# Patient Record
Sex: MALE | Race: OTHER | Employment: OTHER | URBAN - NONMETROPOLITAN AREA
[De-identification: names, ages, dates, MRNs, and addresses within clinical notes are randomized per-mention and may not be internally consistent; named-entity substitution may affect disease eponyms.]

---

## 2024-04-24 ENCOUNTER — EVALUATION (OUTPATIENT)
Dept: PHYSICAL THERAPY | Facility: CLINIC | Age: 64
End: 2024-04-24
Payer: MEDICARE

## 2024-04-24 DIAGNOSIS — M25.512 CHRONIC LEFT SHOULDER PAIN: Primary | ICD-10-CM

## 2024-04-24 DIAGNOSIS — G89.29 CHRONIC LEFT SHOULDER PAIN: Primary | ICD-10-CM

## 2024-04-24 PROCEDURE — 97161 PT EVAL LOW COMPLEX 20 MIN: CPT | Performed by: PHYSICAL THERAPIST

## 2024-04-24 PROCEDURE — 97110 THERAPEUTIC EXERCISES: CPT | Performed by: PHYSICAL THERAPIST

## 2024-04-24 PROCEDURE — 97140 MANUAL THERAPY 1/> REGIONS: CPT | Performed by: PHYSICAL THERAPIST

## 2024-04-24 NOTE — LETTER
2024    Chet Au MD  [address] [facility]  [place] [address]    Patient: Amadeo Fletcher   YOB: 1960   Date of Visit: 2024     Encounter Diagnosis     ICD-10-CM    1. Chronic left shoulder pain  M25.512     G89.29           Dear Dr. Au:    Thank you for your recent referral of Amadeo Fletcher. Please review the attached evaluation summary from Amadeo's recent visit.     Please verify that you agree with the plan of care by signing the attached order.     If you have any questions or concerns, please do not hesitate to call.     I sincerely appreciate the opportunity to share in the care of one of your patients and hope to have another opportunity to work with you in the near future.       Sincerely,    Bird Larsen, PT      Referring Provider:      I certify that I have read the below Plan of Care and certify the need for these services furnished under this plan of treatment while under my care.                    Chet Au MD  [address] [facility]  [place] [address]  Via Fax: [phone]          PT Evaluation     Today's date: 2024  Patient name: Amadeo Fletcher  : 1960  MRN: [id number]  Referring provider: Chet Au MD  Dx:   Encounter Diagnosis     ICD-10-CM    1. Chronic left shoulder pain  M25.512     G89.29           Start Time: 0810  Stop Time: 0900  Total time in clinic (min): 50 minutes    Assessment  Assessment details: Patient is a 64 y/o male with chief c/o L shoulder pain. Patient has note restriction to the L shoulder mobility during arom assessment. There was tenderness noted to the posterior aspect of the L GH joint. Restrictions noted with inferior and posterior capsule mobility assessment. There was increased pain with firm end feel noted at all end ranges of passive mobility assessment. Patient responded well to passive stretching and demonstrated progression of passive mobility during therapy interventions. We reviewed his exercises for  "Hep with verbal understanding noted.   Impairments: abnormal or restricted ROM, activity intolerance, impaired physical strength, lacks appropriate home exercise program and pain with function    Symptom irritability: moderateUnderstanding of Dx/Px/POC: good   Prognosis: good    Goals  STGs:  \"Patient will be independent with hep by 2-3 visits.   Improve shoulder flexion to 140 degrees for improved tolerance with adls and home duties by 3-4 weeks.   Decrease pain levels by 50% for improved tolerance with adls and home duties by 3-4 weeks. \"    LTGs:  \"Improve FOTO score from 45 to 65 indicating improved tolerance with activities involving the upper extremities by discharge.   Patient will demonstrate full, pain free strength and rom of the shoulder for improved tolerance with adls and home duties by discharge.  Sleep will be undisturbed from the shoulder by discharge. \"      Plan  Plan details: Patient informed that from this point forward, to ensure adherence to the aforementioned plan of care, all or some of the treatment may be performed and carried out by a Physical Therapy Assistant (PTA) with supervision from a licensed Physical Therapist (PT) in accordance with [facility] Physical Therapy Practice Act.  Patient will continue to benefit from skilled physical therapy to address the functional deficits that were identified during the evaluation today. We will continue to progress the therapy program to address these functional deficits and achieve the established goals.          Patient would benefit from: skilled physical therapy  Planned modality interventions: cryotherapy  Planned therapy interventions: ADL retraining, body mechanics training, functional ROM exercises, flexibility, home exercise program, therapeutic exercise, therapeutic activities, stretching, strengthening, patient education, postural training, manual therapy and joint mobilization  Frequency: 2-3x week.  Duration in weeks: 6  Plan " of Care beginning date: 2024  Plan of Care expiration date: 2024  Treatment plan discussed with: patient        Subjective Evaluation    History of Present Illness  Mechanism of injury: Patient presents to out patient physical therapy with chief c/o L shoulder pain. Patient reports onset of symptoms to the L shoulder after receiving injections in his L arm approximately 6 months ago. He notes that he has difficulties with laying on his L side and when he is reaching overhead. He feels that the pain is worse when he is trying to sleep at night. He finds relief when he has his arm at rest by his side. He feels that the symptoms will travel into the L forearm and hand at times also.           Recurrent probem    Quality of life: good    Patient Goals  Patient goals for therapy: decreased pain, increased motion, increased strength, independence with ADLs/IADLs and return to sport/leisure activities    Pain  Current pain ratin  At best pain ratin  At worst pain ratin  Location: L shoulder  Quality: sharp, discomfort, throbbing and radiating  Relieving factors: rest and medications  Aggravating factors: overhead activity and lifting (Sleeping, L side lying)        Objective     Static Posture     Scapulae  Left elevated.    Active Range of Motion   Left Shoulder   Flexion: 95 degrees with pain  Extension: 34 degrees with pain  Abduction: 92 degrees with pain    Right Shoulder   Flexion: 152 degrees   Extension: 54 degrees   Abduction: 152 degrees     Passive Range of Motion   Left Shoulder   Flexion: 115 degrees   External rotation 45°: 46 degrees   Internal rotation 45°: 49 degrees     Right Shoulder   Normal passive range of motion    Strength/Myotome Testing     Left Shoulder     Planes of Motion   Flexion: 4-   Extension: 4-   Abduction: 3+   Adduction: 4-   External rotation at 0°: 3+   Internal rotation at 0°: 3+     Right Shoulder   Normal muscle strength    Tests     Left Shoulder   Positive  "Pablo's.   Negative drop arm.              Precautions: None      Date 4/24 IE       FOTO 45 SC       Manuals        L shoulder PROM 10 min                               Neuro Re-Ed                                                                Ther Ex        Supine wand flexion 15x 5\"       Supine wand ER 5\" 15x       Table slide flex, ER 5\" 15x                                               Ther Activity                        Gait Training                        Modalities                                               "

## 2024-04-24 NOTE — PROGRESS NOTES
"PT Evaluation     Today's date: 2024  Patient name: Amadeo Fletcher  : 1960  MRN: [id number]  Referring provider: Chet Au MD  Dx:   Encounter Diagnosis     ICD-10-CM    1. Chronic left shoulder pain  M25.512     G89.29           Start Time: 0810  Stop Time: 0900  Total time in clinic (min): 50 minutes    Assessment  Assessment details: Patient is a 64 y/o male with chief c/o L shoulder pain. Patient has note restriction to the L shoulder mobility during arom assessment. There was tenderness noted to the posterior aspect of the L GH joint. Restrictions noted with inferior and posterior capsule mobility assessment. There was increased pain with firm end feel noted at all end ranges of passive mobility assessment. Patient responded well to passive stretching and demonstrated progression of passive mobility during therapy interventions. We reviewed his exercises for Hep with verbal understanding noted.   Impairments: abnormal or restricted ROM, activity intolerance, impaired physical strength, lacks appropriate home exercise program and pain with function    Symptom irritability: moderateUnderstanding of Dx/Px/POC: good   Prognosis: good    Goals  STGs:  \"Patient will be independent with hep by 2-3 visits.   Improve shoulder flexion to 140 degrees for improved tolerance with adls and home duties by 3-4 weeks.   Decrease pain levels by 50% for improved tolerance with adls and home duties by 3-4 weeks. \"    LTGs:  \"Improve FOTO score from 45 to 65 indicating improved tolerance with activities involving the upper extremities by discharge.   Patient will demonstrate full, pain free strength and rom of the shoulder for improved tolerance with adls and home duties by discharge.  Sleep will be undisturbed from the shoulder by discharge. \"      Plan  Plan details: Patient informed that from this point forward, to ensure adherence to the aforementioned plan of care, all or some of the treatment may be " performed and carried out by a Physical Therapy Assistant (PTA) with supervision from a licensed Physical Therapist (PT) in accordance with [facility] Physical Therapy Practice Act.  Patient will continue to benefit from skilled physical therapy to address the functional deficits that were identified during the evaluation today. We will continue to progress the therapy program to address these functional deficits and achieve the established goals.          Patient would benefit from: skilled physical therapy  Planned modality interventions: cryotherapy  Planned therapy interventions: ADL retraining, body mechanics training, functional ROM exercises, flexibility, home exercise program, therapeutic exercise, therapeutic activities, stretching, strengthening, patient education, postural training, manual therapy and joint mobilization  Frequency: 2-3x week.  Duration in weeks: 6  Plan of Care beginning date: 2024  Plan of Care expiration date: 2024  Treatment plan discussed with: patient        Subjective Evaluation    History of Present Illness  Mechanism of injury: Patient presents to out patient physical therapy with chief c/o L shoulder pain. Patient reports onset of symptoms to the L shoulder after receiving injections in his L arm approximately 6 months ago. He notes that he has difficulties with laying on his L side and when he is reaching overhead. He feels that the pain is worse when he is trying to sleep at night. He finds relief when he has his arm at rest by his side. He feels that the symptoms will travel into the L forearm and hand at times also.           Recurrent probem    Quality of life: good    Patient Goals  Patient goals for therapy: decreased pain, increased motion, increased strength, independence with ADLs/IADLs and return to sport/leisure activities    Pain  Current pain ratin  At best pain ratin  At worst pain ratin  Location: L shoulder  Quality: sharp,  "discomfort, throbbing and radiating  Relieving factors: rest and medications  Aggravating factors: overhead activity and lifting (Sleeping, L side lying)        Objective     Static Posture     Scapulae  Left elevated.    Active Range of Motion   Left Shoulder   Flexion: 95 degrees with pain  Extension: 34 degrees with pain  Abduction: 92 degrees with pain    Right Shoulder   Flexion: 152 degrees   Extension: 54 degrees   Abduction: 152 degrees     Passive Range of Motion   Left Shoulder   Flexion: 115 degrees   External rotation 45°: 46 degrees   Internal rotation 45°: 49 degrees     Right Shoulder   Normal passive range of motion    Strength/Myotome Testing     Left Shoulder     Planes of Motion   Flexion: 4-   Extension: 4-   Abduction: 3+   Adduction: 4-   External rotation at 0°: 3+   Internal rotation at 0°: 3+     Right Shoulder   Normal muscle strength    Tests     Left Shoulder   Positive Hawkin's.   Negative drop arm.              Precautions: None      Date 4/24 IE       FOTO 45 SC       Manuals        L shoulder PROM 10 min                               Neuro Re-Ed                                                                Ther Ex        Supine wand flexion 15x 5\"       Supine wand ER 5\" 15x       Table slide flex, ER 5\" 15x                                               Ther Activity                        Gait Training                        Modalities                               "

## 2024-04-29 ENCOUNTER — OFFICE VISIT (OUTPATIENT)
Dept: PHYSICAL THERAPY | Facility: CLINIC | Age: 64
End: 2024-04-29
Payer: MEDICARE

## 2024-04-29 DIAGNOSIS — G89.29 CHRONIC LEFT SHOULDER PAIN: Primary | ICD-10-CM

## 2024-04-29 DIAGNOSIS — M25.512 CHRONIC LEFT SHOULDER PAIN: Primary | ICD-10-CM

## 2024-04-29 PROCEDURE — 97110 THERAPEUTIC EXERCISES: CPT

## 2024-04-29 PROCEDURE — 97140 MANUAL THERAPY 1/> REGIONS: CPT

## 2024-04-29 NOTE — PROGRESS NOTES
"Daily Note     Today's date: 2024  Patient name: Amadoe Fletcher  : 1960  MRN: [id number]  Referring provider: Chet Au MD  Dx:   Encounter Diagnosis     ICD-10-CM    1. Chronic left shoulder pain  M25.512     G89.29           Start Time: 1150  Stop Time: 1220  Total time in clinic (min): 30 minutes    Subjective: The patient states that he has a little pain in his left shoulder today, which he rates at 3/10      Objective: See treatment diary below      Assessment: Tolerated treatment well. The patient complained of increased shoulder pain with supine wand flexion. Added several new exercises today including pulleys, shoulder shrugs, scapular retractions and no monies - all with good tolerance and minimal complaints of increased shoulder pain. Patient exhibited good technique with therapeutic exercises and would benefit from continued PT      Plan: Continue per plan of care.      Precautions: None      Date  IE       FOTO 45 SC       Manuals        L shoulder PROM 10 min 10 min AW                              Neuro Re-Ed                                                                Ther Ex        Supine wand flexion 15x 5\" 5\" 15x      Supine wand ER 5\" 15x 5\" 15x      Table slide flex, ER 5\" 15x 5\" 15x      Pulleys  5\" 10x      No monies  15x      Shoulder shrugs  5\" 15x      Scap retr  5\" x15              Ther Activity                        Gait Training                        Modalities                               "

## 2024-05-01 ENCOUNTER — OFFICE VISIT (OUTPATIENT)
Dept: PHYSICAL THERAPY | Facility: CLINIC | Age: 64
End: 2024-05-01
Payer: MEDICARE

## 2024-05-01 DIAGNOSIS — M25.512 CHRONIC LEFT SHOULDER PAIN: Primary | ICD-10-CM

## 2024-05-01 DIAGNOSIS — G89.29 CHRONIC LEFT SHOULDER PAIN: Primary | ICD-10-CM

## 2024-05-01 PROCEDURE — 97140 MANUAL THERAPY 1/> REGIONS: CPT

## 2024-05-01 PROCEDURE — 97110 THERAPEUTIC EXERCISES: CPT

## 2024-05-01 NOTE — PROGRESS NOTES
"Daily Note     Today's date: 2024  Patient name: Amadeo Fletcher  : 1960  MRN: [id number]  Referring provider: Chet Au MD  Dx:   Encounter Diagnosis     ICD-10-CM    1. Chronic left shoulder pain  M25.512     G89.29           Start Time: 1145  Stop Time: 1230  Total time in clinic (min): 45 minutes    Subjective: Patient reports having some pain in the L shoulder today.      Objective: See treatment diary below      Assessment: Tolerated treatment well.   Patient participated in skilled PT session focused on strengthening, stretching, and ROM.  Patient able to complete exercise program with no issues.  Patient with rounded shoulders, needing v.c. for postural awareness.  Patient demonstrates increased guarding and tension with passive ROM with increased difficulty with L shoulder flexion, ER/IR ROM.  Constant v.c. for relaxing and proper technique with exercises.  Patient would continue to benefit from skilled PT interventions to address strengthening, stretching, and ROM. Patient demonstrated fatigue post treatment      Plan: Continue per plan of care.      Precautions: None      Date  IE      FOTO 45 SC       Manuals        L shoulder PROM 10 min 10 min AW 10 min CD                             Neuro Re-Ed                                                                Ther Ex        Supine wand flexion 15x 5\" 5\" 15x 5\" 15x     Supine wand ER 5\" 15x 5\" 15x 5\" 15x     Table slide flex, ER 5\" 15x 5\" 15x 5\" 15x     Pulleys  5\" 10x 5\" 10x     No monies  15x      Shoulder shrugs  5\" 15x 5\" 15x     Scap retr  5\" x15 5\" 15x             Ther Activity                        Gait Training                        Modalities                                 "

## 2024-05-03 ENCOUNTER — APPOINTMENT (OUTPATIENT)
Dept: PHYSICAL THERAPY | Facility: CLINIC | Age: 64
End: 2024-05-03
Payer: MEDICARE

## 2024-05-06 ENCOUNTER — OFFICE VISIT (OUTPATIENT)
Dept: PHYSICAL THERAPY | Facility: CLINIC | Age: 64
End: 2024-05-06
Payer: MEDICARE

## 2024-05-06 DIAGNOSIS — G89.29 CHRONIC LEFT SHOULDER PAIN: Primary | ICD-10-CM

## 2024-05-06 DIAGNOSIS — M25.512 CHRONIC LEFT SHOULDER PAIN: Primary | ICD-10-CM

## 2024-05-06 PROCEDURE — 97110 THERAPEUTIC EXERCISES: CPT | Performed by: PHYSICAL THERAPIST

## 2024-05-06 PROCEDURE — 97140 MANUAL THERAPY 1/> REGIONS: CPT | Performed by: PHYSICAL THERAPIST

## 2024-05-06 NOTE — PROGRESS NOTES
"Daily Note     Today's date: 2024  Patient name: Amadeo Fletcher  : 1960  MRN: [id number]  Referring provider: Chet Au MD  Dx:   Encounter Diagnosis     ICD-10-CM    1. Chronic left shoulder pain  M25.512     G89.29           Start Time: 1145  Stop Time: 1234  Total time in clinic (min): 49 minutes    Subjective: Patient reports that his shoulder is still painful but that his mobility seems to be improving. He feels that overhead reaching and reaching behind his back continue to be the most difficult movements for him.       Objective: See treatment diary below      Assessment: Tolerated treatment well. Patient exhibited good technique with therapeutic exercises and would benefit from continued PT Continued to focus on improving mobility of the L shoulder with therapy progression today. He has the greatest restriction with passive IR of the L should during passive stretching today. There were reports of increased pain at end ranges of passive stretching but this decreased upon release of stretch.   Visual inspection of skin post application of MHP revealed no abnormalities.            Plan: Continue per plan of care.  Progress treatment as tolerated.       Precautions: None      Date  IE     FOTO 45 SC   59 SC    Manuals        L shoulder PROM 10 min 10 min AW 10 min CD 15 min SC    Inferior and posterior Grade 4 joint mobilizations to L GH joint    8 min SC                    Neuro Re-Ed                                                                Ther Ex        Supine wand flexion 15x 5\" 5\" 15x 5\" 15x 5\" 15x    Supine wand ER 5\" 15x 5\" 15x 5\" 15x     Table slide flex, ER 5\" 15x 5\" 15x 5\" 15x Wall slide 5\" 20x    Pulleys  5\" 10x 5\" 10x 5\" 15x    No monies  15x      Shoulder shrugs  5\" 15x 5\" 15x     Scap retr  5\" x15 5\" 15x     UBE for mobility and strengthening    L2 2'/2'    Table traction stretch    10\" 10x    Ther Activity                        Gait Training              "           Modalities        MHP to L shoulder    10 min post

## 2024-05-08 ENCOUNTER — OFFICE VISIT (OUTPATIENT)
Dept: PHYSICAL THERAPY | Facility: CLINIC | Age: 64
End: 2024-05-08
Payer: MEDICARE

## 2024-05-08 DIAGNOSIS — M25.512 CHRONIC LEFT SHOULDER PAIN: Primary | ICD-10-CM

## 2024-05-08 DIAGNOSIS — G89.29 CHRONIC LEFT SHOULDER PAIN: Primary | ICD-10-CM

## 2024-05-08 PROCEDURE — 97140 MANUAL THERAPY 1/> REGIONS: CPT | Performed by: PHYSICAL THERAPIST

## 2024-05-08 PROCEDURE — 97110 THERAPEUTIC EXERCISES: CPT | Performed by: PHYSICAL THERAPIST

## 2024-05-08 NOTE — PROGRESS NOTES
"Daily Note     Today's date: 2024  Patient name: Amadeo Fletcher  : 1960  MRN: [id number]  Referring provider: Chet Au MD  Dx:   Encounter Diagnosis     ICD-10-CM    1. Chronic left shoulder pain  M25.512     G89.29           Start Time: 1145  Stop Time: 1230  Total time in clinic (min): 45 minutes    Subjective: Patient remarks on improvements with both pain and mobility in his L shoulder since starting therapy. He feels that he is still limited with overhead reaching.       Objective: See treatment diary below      Assessment: Tolerated treatment well. Patient demonstrated fatigue post treatment, exhibited good technique with therapeutic exercises, and would benefit from continued PT Therapy interventions continue to focus on progression of L shoulder mobility both actively and passively. He continues with noted mobility restrictions of the L shoulder most noted during ER and IR.       Plan: Continue per plan of care.  Progress treatment as tolerated.       Precautions: None      Date  IE     FOTO 45 SC   59 SC    Manuals        L shoulder PROM 10 min 10 min AW 10 min CD 15 min SC 10 min SC   Inferior and posterior Grade 4 joint mobilizations to L GH joint    8 min SC 5 min SC                   Neuro Re-Ed        Bent forward flexion and abduction     5\" 15x                                                   Ther Ex        Supine wand flexion 15x 5\" 5\" 15x 5\" 15x 5\" 15x 5\" 15x   Supine wand ER 5\" 15x 5\" 15x 5\" 15x  5\" 15x   Table slide flex, ER 5\" 15x 5\" 15x 5\" 15x Wall slide 5\" 20x 5\" 15x wall slide   Pulleys  5\" 10x 5\" 10x 5\" 15x 5\" 20x   No monies  15x      Shoulder shrugs  5\" 15x 5\" 15x     Scap retraction  5\" x15 5\" 15x     UBE for mobility and strengthening    L2 2'/2' L2 2'/2'   Table traction stretch    10\" 10x 10\" 10x   Ther Activity                        Gait Training                        Modalities        MHP to L shoulder    10 min post                         "

## 2024-05-13 ENCOUNTER — OFFICE VISIT (OUTPATIENT)
Dept: PHYSICAL THERAPY | Facility: CLINIC | Age: 64
End: 2024-05-13
Payer: MEDICARE

## 2024-05-13 DIAGNOSIS — G89.29 CHRONIC LEFT SHOULDER PAIN: Primary | ICD-10-CM

## 2024-05-13 DIAGNOSIS — M25.512 CHRONIC LEFT SHOULDER PAIN: Primary | ICD-10-CM

## 2024-05-13 PROCEDURE — 97140 MANUAL THERAPY 1/> REGIONS: CPT

## 2024-05-13 PROCEDURE — 97110 THERAPEUTIC EXERCISES: CPT

## 2024-05-13 NOTE — PROGRESS NOTES
"Daily Note     Today's date: 2024  Patient name: Amadeo Fletcher  : 1960  MRN: [id number]  Referring provider: Chet Au MD  Dx:   Encounter Diagnosis     ICD-10-CM    1. Chronic left shoulder pain  M25.512     G89.29           Start Time: 1145  Stop Time: 1230  Total time in clinic (min): 45 minutes    Subjective: I have some mild pain in my left shoulder today.,       Objective: See treatment diary below      Assessment: Tolerated treatment well. Patient would benefit from continued PT   pt reports having most of his pain with reaching behind his back. He has less pain with reaching overhead. He feels his motion is improving as well as decreased pain.  He had some limitations with passive flexion., Er and IR today., he had most pain with passive ER today. He had good tolerance with the exercises today. We will continue to work on his motion and decrease pain.       Plan: Continue per plan of care.      Precautions: None      Date  IE    FOTO 45 SC   59 SC    Manuals        L shoulder PROM 10 min 10 min AW 10 min CD 15 min SC 10 min  8   Inferior and posterior Grade 4 joint mobilizations to L GH joint    8 min SC                    Neuro Re-Ed        Bent forward flexion and abduction     15x                                                   Ther Ex        Supine wand flexion 15x 5\" 5\" 15x 5\" 15x 5\" 15x 5\" 15x   Supine wand ER 5\" 15x 5\" 15x 5\" 15x  5\" 15x   Table slide flex, ER 5\" 15x 5\" 15x 5\" 15x Wall slide 5\" 20x 5\" 15x wall slide   Pulleys  5\" 10x 5\" 10x 5\" 15x 5\" 20x   No monies  15x   15 x   Shoulder shrugs  5\" 15x 5\" 15x  20 x   3\"    Scap retraction  5\" x15 5\" 15x     UBE for mobility and strengthening    L2 2'/2' L2 2'/2'   Table traction stretch    10\" 10x 10\"  5 x    Ther Activity                        Gait Training                        Modalities        MHP to L shoulder    10 min post                           "

## 2024-05-15 ENCOUNTER — OFFICE VISIT (OUTPATIENT)
Dept: PHYSICAL THERAPY | Facility: CLINIC | Age: 64
End: 2024-05-15
Payer: MEDICARE

## 2024-05-15 DIAGNOSIS — M25.512 CHRONIC LEFT SHOULDER PAIN: Primary | ICD-10-CM

## 2024-05-15 DIAGNOSIS — G89.29 CHRONIC LEFT SHOULDER PAIN: Primary | ICD-10-CM

## 2024-05-15 PROCEDURE — 97140 MANUAL THERAPY 1/> REGIONS: CPT

## 2024-05-15 PROCEDURE — 97110 THERAPEUTIC EXERCISES: CPT

## 2024-05-15 PROCEDURE — 97112 NEUROMUSCULAR REEDUCATION: CPT

## 2024-05-15 NOTE — PROGRESS NOTES
"Daily Note     Today's date: 5/15/2024  Patient name: Amadeo Fletcher  : 1960  MRN: [id number]  Referring provider: Chet Au MD  Dx:   Encounter Diagnosis     ICD-10-CM    1. Chronic left shoulder pain  M25.512     G89.29           Start Time: 1145  Stop Time: 1230  Total time in clinic (min): 45 minutes    Subjective: I have some mild pain so far today. I did feel like my motion was better after my last visit.       Objective: See treatment diary below      Assessment: Tolerated treatment well. Patient would benefit from continued PT  pt had less sharp pain today with his exercises and passive stretching., he needs verbal cues through out to do the exercises correctly. He still had most pain with passive flex, ER and IR today,. He had most with Er today , but, is showing improvement with his motion., we will continue to work on his motion and strength.      Plan: Continue per plan of care.      Precautions: None      Date 5/15 4/29 5/1 5/6 5/13   FOTO    59 SC    Manuals        L shoulder PROM 10 min 10 min AW 10 min CD 15 min SC 10 min  8   Inferior and posterior Grade 4 joint mobilizations to L GH joint    8 min SC                    Neuro Re-Ed        Bent forward flexion and abduction 15x / 15  x    15x                                                   Ther Ex        Supine wand flexion 15x 5\" 5\" 15x 5\" 15x 5\" 15x 5\" 15x   Supine wand ER 5\" 15x 5\" 15x 5\" 15x  5\" 15x   Table slide flex, ER 5\" 15x  wall  5\" 15x 5\" 15x Wall slide 5\" 20x 5\" 15x wall slide   Pulleys 5\" 15 x  5\" 10x 5\" 10x 5\" 15x 5\" 20x   No monies 15 x   3 \"  15x   15 x   Shoulder shrugs 20 x 5\" 15x 5\" 15x  20 x   3\"    Scap retraction 5 \" 15 x  5\" x15 5\" 15x     UBE for mobility and strengthening L 2  2/2    L2 2'/2' L2 2'/2'   Table traction stretch 10 \" 5 x   10\" 10x 10\"  5 x    Ther Activity                        Gait Training                        Modalities        MHP to L shoulder    10 min post                             "

## 2024-05-20 ENCOUNTER — OFFICE VISIT (OUTPATIENT)
Dept: PHYSICAL THERAPY | Facility: CLINIC | Age: 64
End: 2024-05-20
Payer: MEDICARE

## 2024-05-20 DIAGNOSIS — G89.29 CHRONIC LEFT SHOULDER PAIN: Primary | ICD-10-CM

## 2024-05-20 DIAGNOSIS — M25.512 CHRONIC LEFT SHOULDER PAIN: Primary | ICD-10-CM

## 2024-05-20 PROCEDURE — 97110 THERAPEUTIC EXERCISES: CPT

## 2024-05-20 NOTE — PROGRESS NOTES
"Daily Note     Today's date: 2024  Patient name: Amadeo Fletcher  : 1960  MRN: [id number]  Referring provider: Chet Au MD  Dx:   Encounter Diagnosis     ICD-10-CM    1. Chronic left shoulder pain  M25.512     G89.29           Start Time: 1145  Stop Time: 1230  Total time in clinic (min): 45 minutes    Subjective: My shoulder is feeling better over all.       Objective: See treatment diary below      Assessment: Tolerated treatment well. Patient would benefit from continued PT   pt reports having some mild to moderate pain at times with his exercises today. He reports over all he is feeling improvement with his motion and a decrease in pain. He still gets moderate to strong pain with passive flexion and Er today. We will continue to work on his motion and decrease pain.      Plan: Continue per plan of care.      Precautions: None      Date 5/15 5/20 5/1 5/6 5/13   FOTO    59 SC    Manuals        L shoulder PROM 10 min 10 minJF 10 min CD 15 min SC 10 min  8   Inferior and posterior Grade 4 joint mobilizations to L GH joint    8 min SC                    Neuro Re-Ed        Bent forward flexion and abduction 15x / 15  x 20 x/ 20 x   15x                                                   Ther Ex        Supine wand flexion 15x 5\" 5\" 15x 5\" 15x 5\" 15x 5\" 15x   Supine wand ER 5\" 15x 5\" 15x 5\" 15x  5\" 15x   Table slide flex, ER 5\" 15x  wall  5\" 15x 5\" 15x Wall slide 5\" 20x 5\" 15x wall slide   Pulleys 5\" 15 x  5\" 10x 5\" 10x 5\" 15x 5\" 20x   No monies 15 x   3 \"  15x   15 x   Shoulder shrugs 20 x 2\" 15x 5\" 15x  20 x   3\"    Scap retraction 5 \" 15 x  2\" x15 5\" 15x     UBE for mobility and strengthening L 2  2/2  L 2  2/2  L2 2'/2' L2 2'/2'   Table traction stretch 10 \" 5 x 10\" 5 x  10\" 10x 10\"  5 x    Ther Activity                        Gait Training                        Modalities        MHP to L shoulder    10 min post                               "

## 2024-05-24 ENCOUNTER — OFFICE VISIT (OUTPATIENT)
Dept: PHYSICAL THERAPY | Facility: CLINIC | Age: 64
End: 2024-05-24
Payer: MEDICARE

## 2024-05-24 DIAGNOSIS — G89.29 CHRONIC LEFT SHOULDER PAIN: Primary | ICD-10-CM

## 2024-05-24 DIAGNOSIS — M25.512 CHRONIC LEFT SHOULDER PAIN: Primary | ICD-10-CM

## 2024-05-24 PROCEDURE — 97112 NEUROMUSCULAR REEDUCATION: CPT

## 2024-05-24 PROCEDURE — 97140 MANUAL THERAPY 1/> REGIONS: CPT

## 2024-05-24 PROCEDURE — 97110 THERAPEUTIC EXERCISES: CPT

## 2024-05-24 NOTE — PROGRESS NOTES
"Daily Note     Today's date: 2024  Patient name: Amadeo Fletcher  : 1960  MRN: [id number]  Referring provider: Chet Au MD  Dx:   Encounter Diagnosis     ICD-10-CM    1. Chronic left shoulder pain  M25.512     G89.29           Start Time: 1100  Stop Time: 1150  Total time in clinic (min): 50 minutes    Subjective: I am doing ok , but, I still have some trouble reaching over head and behind my back.  It is improving though.       Objective: See treatment diary below      Assessment: Tolerated treatment well. Patient would benefit from continued PT   pt began Cable rows today with good tolerance. He still has most pain with table traction stretch and table slides into flexion. He also reports strong pain with passive ER . He is slowly gaining motion with all planes of his left shoudler. He is most restricted with ER and IR today.  We will continue to work on his motion and decrease pain.       Plan: Continue per plan of care.      Precautions: None      Date 5/15 5/20 5/24 5/6 5/13   FOTO   Jf  63 59 SC    Manuals        L shoulder PROM 10 min 10 minJF 10 min jf 15 min SC 10 min  8   Inferior and posterior Grade 4 joint mobilizations to L GH joint    8 min SC                    Neuro Re-Ed        Bent forward flexion and abduction 15x / 15  x 20 x/ 20 x 20 x 20 x   3 \"  15x                                                   Ther Ex        Supine wand flexion 15x 5\" 5\" 15x 5\" 15x 5\" 15x 5\" 15x   Supine wand ER 5\" 15x 5\" 15x 5\" 15x  5\" 15x   Table slide flex, ER 5\" 15x  wall  5\" 15x 5\" 15x Wall slide 5\" 20x 5\" 15x wall slide   Pulleys 5\" 15 x  5\" 10x 5\" 10x 5\" 15x 5\" 20x   No monies 15 x   3 \"  15x   15 x   Shoulder shrugs 20 x 2\" 15x 3\" 20 x  20 x   3\"    CC row  5 \" 15 x  2\" x15 P 2   20 x     UBE for mobility and strengthening L 2  2/2  L 2  2/2 L 2 2/2 L2 2'/2' L2 2'/2'   Table traction stretch 10 \" 5 x 10\" 5 x 10 \" 5  x 10\" 10x 10\"  5 x    Ther Activity                        Gait Training      "                   Modalities        MHP to L shoulder    10 min post

## 2024-05-30 ENCOUNTER — APPOINTMENT (OUTPATIENT)
Dept: PHYSICAL THERAPY | Facility: CLINIC | Age: 64
End: 2024-05-30
Payer: MEDICARE

## 2024-06-03 ENCOUNTER — OFFICE VISIT (OUTPATIENT)
Dept: PHYSICAL THERAPY | Facility: CLINIC | Age: 64
End: 2024-06-03
Payer: MEDICARE

## 2024-06-03 DIAGNOSIS — G89.29 CHRONIC LEFT SHOULDER PAIN: Primary | ICD-10-CM

## 2024-06-03 DIAGNOSIS — M25.512 CHRONIC LEFT SHOULDER PAIN: Primary | ICD-10-CM

## 2024-06-03 PROCEDURE — 97140 MANUAL THERAPY 1/> REGIONS: CPT | Performed by: PHYSICAL THERAPIST

## 2024-06-03 PROCEDURE — 97110 THERAPEUTIC EXERCISES: CPT | Performed by: PHYSICAL THERAPIST

## 2024-06-03 NOTE — PROGRESS NOTES
"Daily Note     Today's date: 6/3/2024  Patient name: Amadeo Fletcher  : 1960  MRN: [id number]  Referring provider: Chet Au MD  Dx:   Encounter Diagnosis     ICD-10-CM    1. Chronic left shoulder pain  M25.512     G89.29           Start Time: 1015  Stop Time: 1100  Total time in clinic (min): 45 minutes    Subjective: Patient said he was doing okay today and stated he has noticed he has been regaining some motion. He still has some trouble reaching overhead but has noticed some improvement.       Objective: See treatment diary below      Assessment: Tolerated treatment well. During PROM patient had significant restriction into IR and could not tolerate abd in the frontal plane. Patient still demonstrated restriction in IR, ER, flex, and abd during active and passive motion with IR being the most. Patient had a hard end feel with IR and ER and had a little give with flex and abd in the scapular plane. Patient responded well to shoulder mobilizations and stretches with minimal reported pain. Patient also transitioned from performing abduction bent over to performing abduction in the scapular plane while standing. Patient tolerated this progression well and performed the exercise appropriately. Patient exhibited good technique with therapeutic exercises and would benefit from continued PT to continue to restore joint mobility and ROM.       Plan: Continue per plan of care.      Precautions: None      Date 5/15 5/20 5/24 6/3 5/13   FOTO   Jf  63     Manuals        L shoulder PROM 10 min 10 minJF 10 min jf 10 min 10 min  8   Inferior and posterior Grade 4 joint mobilizations to L GH joint    5 min                    Neuro Re-Ed        Standing flexion and abduction 15x / 15  x 20 x/ 20 x 20 x 20 x   3 \" 20x abd 15x                                                   Ther Ex        Supine wand flexion 15x 5\" 5\" 15x 5\" 15x  5\" 15x   Supine wand ER 5\" 15x 5\" 15x 5\" 15x 5\" 15x 5\" 15x   Table slide flex, ER 5\" " "15x  wall  5\" 15x 5\" 15x  5\" 15x wall slide   Pulleys 5\" 15 x  5\" 10x 5\" 10x 5\" 15x 5\" 20x   No monies 15 x   3 \"  15x   15 x   Shoulder shrugs 20 x 2\" 15x 3\" 20 x  20 x   3\"    CC row  5 \" 15 x  2\" x15 P 2   20 x P2 20x    UBE for mobility and strengthening L 2  2/2  L 2  2/2 L 2 2/2 L2 2'/2' L2 2'/2'   Table traction stretch 10 \" 5 x 10\" 5 x 10 \" 5  x 10\" 10x 10\"  5 x    Ther Activity                        Gait Training                        Modalities        MHP to L shoulder                                       "

## 2024-06-05 ENCOUNTER — EVALUATION (OUTPATIENT)
Dept: PHYSICAL THERAPY | Facility: CLINIC | Age: 64
End: 2024-06-05
Payer: MEDICARE

## 2024-06-05 DIAGNOSIS — M25.512 CHRONIC LEFT SHOULDER PAIN: Primary | ICD-10-CM

## 2024-06-05 DIAGNOSIS — G89.29 CHRONIC LEFT SHOULDER PAIN: Primary | ICD-10-CM

## 2024-06-05 PROCEDURE — 97112 NEUROMUSCULAR REEDUCATION: CPT

## 2024-06-05 PROCEDURE — 97110 THERAPEUTIC EXERCISES: CPT

## 2024-06-05 PROCEDURE — 97140 MANUAL THERAPY 1/> REGIONS: CPT

## 2024-06-05 NOTE — PROGRESS NOTES
"PT Re-Evaluation     Today's date: 2024  Patient name: Amadeo Fletcher  : 1960  MRN: [id number]  Referring provider: Chet Au MD  Dx:   Encounter Diagnosis     ICD-10-CM    1. Chronic left shoulder pain  M25.512     G89.29                      Assessment  Impairments: abnormal or restricted ROM, activity intolerance, impaired physical strength, lacks appropriate home exercise program and pain with function  Symptom irritability: moderate    Assessment details: Patient has completed 10 physical therapy visits since the initial evaluation. He is demonstrating improvements with mobility and strength to the L UE. He is also reporting a decrease in pain levels during active mobility assessment. He continues to have the greatest restriction with L shoulder IR and is demonstrating a capsular restriction pattern to the L shoulder during passive mobility assessment.   Understanding of Dx/Px/POC: good     Prognosis: good    Goals  STGs:  \"Patient will be independent with hep by 2-3 visits. - MET  Improve shoulder flexion to 140 degrees for improved tolerance with adls and home duties by 3-4 weeks. - Progressing  Decrease pain levels by 50% for improved tolerance with adls and home duties by 3-4 weeks. \" - MET    LTGs:  \"Improve FOTO score from 45 to 65 indicating improved tolerance with activities involving the upper extremities by discharge. - Progressing  Patient will demonstrate full, pain free strength and rom of the shoulder for improved tolerance with adls and home duties by discharge. - Progressing  Sleep will be undisturbed from the shoulder by discharge. \" - Progressing      Plan  Patient would benefit from: skilled physical therapy  Planned modality interventions: cryotherapy    Planned therapy interventions: ADL retraining, body mechanics training, functional ROM exercises, flexibility, home exercise program, therapeutic exercise, therapeutic activities, stretching, strengthening, patient " education, postural training, manual therapy and joint mobilization    Frequency: 2-3x week.  Duration in weeks: 4  Plan of Care beginning date: 6/5/2024  Plan of Care expiration date: 7/3/2024  Treatment plan discussed with: patient  Plan details: Patient informed that from this point forward, to ensure adherence to the aforementioned plan of care, all or some of the treatment may be performed and carried out by a Physical Therapy Assistant (PTA) with supervision from a licensed Physical Therapist (PT) in accordance with [facility] Physical Therapy Practice Act.  Patient will continue to benefit from skilled physical therapy to address the functional deficits that were identified during the evaluation today. We will continue to progress the therapy program to address these functional deficits and achieve the established goals.                Subjective Evaluation    History of Present Illness  Mechanism of injury: Patient presents to out patient physical therapy with chief c/o L shoulder pain. Patient reports onset of symptoms to the L shoulder after receiving injections in his L arm approximately 6 months ago. He notes that he has difficulties with laying on his L side and when he is reaching overhead. He feels that the pain is worse when he is trying to sleep at night. He finds relief when he has his arm at rest by his side. He feels that the symptoms will travel into the L forearm and hand at times also.     Update 6/5/2024:  Patient reports continued improvements with mobility of the L shoulder. He feels as though when he is reaching for his normal adls that he is noticing less pain and improved mobility. There continues to be most difficulty with overhead reaching and reaching behind his back.           Recurrent probem    Quality of life: good    Patient Goals  Patient goals for therapy: decreased pain, increased motion, increased strength, independence with ADLs/IADLs and return to sport/leisure  activities    Pain  Current pain ratin  At best pain ratin  At worst pain ratin  Location: L shoulder  Quality: sharp, discomfort, throbbing and radiating  Relieving factors: rest and medications  Aggravating factors: overhead activity and lifting (Sleeping, L side lying)        Objective     Static Posture     Scapulae  Left elevated.    Active Range of Motion   Left Shoulder   Flexion: 129 degrees   Extension: 51 degrees with pain  Abduction: 104 degrees with pain    Right Shoulder   Flexion: 152 degrees   Extension: 54 degrees   Abduction: 152 degrees     Passive Range of Motion   Left Shoulder   Flexion: 136 degrees   External rotation 90°: 74 degrees   Internal rotation 90°: 40 degrees     Right Shoulder   Normal passive range of motion    Strength/Myotome Testing     Left Shoulder     Planes of Motion   Flexion: 4-   Extension: 4   Abduction: 4-   Adduction: 4-   External rotation at 0°: 4-   Internal rotation at 0°: 3+     Right Shoulder   Normal muscle strength    Tests     Left Shoulder   Negative drop arm and Hawkin's.              Precautions: None

## 2024-06-05 NOTE — LETTER
2024    Chet Au MD  [address]  [place] [address]    Patient: Amadeo Fletcher   YOB: 1960   Date of Visit: 2024     Encounter Diagnosis     ICD-10-CM    1. Chronic left shoulder pain  M25.512     G89.29           Dear Dr. Au:    Thank you for your recent referral of Amadeo Fletcher. Please review the attached evaluation summary from Amadeo's recent visit.     Please verify that you agree with the plan of care by signing the attached order.     If you have any questions or concerns, please do not hesitate to call.     I sincerely appreciate the opportunity to share in the care of one of your patients and hope to have another opportunity to work with you in the near future.       Sincerely,    Bird Larsen, PT      Referring Provider:      I certify that I have read the below Plan of Care and certify the need for these services furnished under this plan of treatment while under my care.                    Chet Au MD  [address]  [place] [address]  Via Fax: [phone]          PT Re-Evaluation     Today's date: 2024  Patient name: Amadeo Fletcher  : 1960  MRN: [id number]  Referring provider: Chet Au MD  Dx:   Encounter Diagnosis     ICD-10-CM    1. Chronic left shoulder pain  M25.512     G89.29                      Assessment  Impairments: abnormal or restricted ROM, activity intolerance, impaired physical strength, lacks appropriate home exercise program and pain with function  Symptom irritability: moderate    Assessment details: Patient has completed 10 physical therapy visits since the initial evaluation. He is demonstrating improvements with mobility and strength to the L UE. He is also reporting a decrease in pain levels during active mobility assessment. He continues to have the greatest restriction with L shoulder IR and is demonstrating a capsular restriction pattern to the L shoulder during passive mobility assessment.  "  Understanding of Dx/Px/POC: good     Prognosis: good    Goals  STGs:  \"Patient will be independent with hep by 2-3 visits. - MET  Improve shoulder flexion to 140 degrees for improved tolerance with adls and home duties by 3-4 weeks. - Progressing  Decrease pain levels by 50% for improved tolerance with adls and home duties by 3-4 weeks. \" - MET    LTGs:  \"Improve FOTO score from 45 to 65 indicating improved tolerance with activities involving the upper extremities by discharge. - Progressing  Patient will demonstrate full, pain free strength and rom of the shoulder for improved tolerance with adls and home duties by discharge. - Progressing  Sleep will be undisturbed from the shoulder by discharge. \" - Progressing      Plan  Patient would benefit from: skilled physical therapy  Planned modality interventions: cryotherapy    Planned therapy interventions: ADL retraining, body mechanics training, functional ROM exercises, flexibility, home exercise program, therapeutic exercise, therapeutic activities, stretching, strengthening, patient education, postural training, manual therapy and joint mobilization    Frequency: 2-3x week.  Duration in weeks: 4  Plan of Care beginning date: 6/5/2024  Plan of Care expiration date: 7/3/2024  Treatment plan discussed with: patient  Plan details: Patient informed that from this point forward, to ensure adherence to the aforementioned plan of care, all or some of the treatment may be performed and carried out by a Physical Therapy Assistant (PTA) with supervision from a licensed Physical Therapist (PT) in accordance with [facility] Physical Therapy Practice Act.  Patient will continue to benefit from skilled physical therapy to address the functional deficits that were identified during the evaluation today. We will continue to progress the therapy program to address these functional deficits and achieve the established goals.                Subjective Evaluation    History " of Present Illness  Mechanism of injury: Patient presents to out patient physical therapy with chief c/o L shoulder pain. Patient reports onset of symptoms to the L shoulder after receiving injections in his L arm approximately 6 months ago. He notes that he has difficulties with laying on his L side and when he is reaching overhead. He feels that the pain is worse when he is trying to sleep at night. He finds relief when he has his arm at rest by his side. He feels that the symptoms will travel into the L forearm and hand at times also.     Update 2024:  Patient reports continued improvements with mobility of the L shoulder. He feels as though when he is reaching for his normal adls that he is noticing less pain and improved mobility. There continues to be most difficulty with overhead reaching and reaching behind his back.           Recurrent probem    Quality of life: good    Patient Goals  Patient goals for therapy: decreased pain, increased motion, increased strength, independence with ADLs/IADLs and return to sport/leisure activities    Pain  Current pain ratin  At best pain ratin  At worst pain ratin  Location: L shoulder  Quality: sharp, discomfort, throbbing and radiating  Relieving factors: rest and medications  Aggravating factors: overhead activity and lifting (Sleeping, L side lying)        Objective     Static Posture     Scapulae  Left elevated.    Active Range of Motion   Left Shoulder   Flexion: 129 degrees   Extension: 51 degrees with pain  Abduction: 104 degrees with pain    Right Shoulder   Flexion: 152 degrees   Extension: 54 degrees   Abduction: 152 degrees     Passive Range of Motion   Left Shoulder   Flexion: 136 degrees   External rotation 90°: 74 degrees   Internal rotation 90°: 40 degrees     Right Shoulder   Normal passive range of motion    Strength/Myotome Testing     Left Shoulder     Planes of Motion   Flexion: 4-   Extension: 4   Abduction: 4-   Adduction: 4-  "  External rotation at 0°: 4-   Internal rotation at 0°: 3+     Right Shoulder   Normal muscle strength    Tests     Left Shoulder   Negative drop arm and Hawkin's.              Precautions: None               Daily Note     Today's date: 2024  Patient name: Amadeo Fletcher  : 1960  MRN: [id number]  Referring provider: Chet Au MD  Dx:   Encounter Diagnosis     ICD-10-CM    1. Chronic left shoulder pain  M25.512     G89.29           Start Time: 1100  Stop Time: 1215  Total time in clinic (min): 75 minutes    Subjective: My shoulder is feeling better, but, I still get most of my pain reaching over head and behind my back.       Objective: See treatment diary below      Assessment: Tolerated treatment well. Patient would benefit from continued PT  we increased UBE to 3/3 today with only some mild fatigue and soreness. He is showing some improvement with his motion , but, still has tightness in all planes.  He has most tightness with flex, ER and IR. His strength is improving over all. He reports that he still has most pain with passive ER today.  We spent some extra time today working on his motion.       Plan: Continue per plan of care.      Precautions: None      Date 5/15 5/20 5/24 6/3 6/5   FOTO   Jf  63  JF 65   Manuals        L shoulder PROM 10 min 10 minJF 10 min jf 10 min 10 min  8   Inferior and posterior Grade 4 joint mobilizations to L GH joint    5 min                    Neuro Re-Ed        Standing flexion and abduction 15x / 15  x 20 x/ 20 x 20 x 20 x   3 \" 20x abd 30 x 3# flex  3# abduction 30 x                                                   Ther Ex        Supine wand flexion 15x 5\" 5\" 15x 5\" 15x  5\" 15x      Supine wand ER 5\" 15x 5\" 15x 5\" 15x 5\" 15x 5\" 15x   Table slide flex, ER 5\" 15x  wall  5\" 15x 5\" 15x  5\" 15x wall slide   Pulleys 5\" 15 x  5\" 10x 5\" 10x 5\" 15x 5\" 20x   No monies 15 x   3 \"  15x   15 x OTB   Shoulder shrugs 20 x 2\" 15x 3\" 20 x  20 x   3\"   5#    CC row  5 \" " "15 x  2\" x15 P 2   20 x P2 20x P 2 20 x   UBE for mobility and strengthening L 2  2/2  L 2  2/2 L 2 2/2 L2 2'/2' L2 3/3   Table traction stretch 10 \" 5 x 10\" 5 x 10 \" 5  x 10\" 10x 10\"  5 x    Ther Activity                        Gait Training                        Modalities        MHP to L shoulder                                                         "

## 2024-06-05 NOTE — PROGRESS NOTES
"Daily Note     Today's date: 2024  Patient name: Amadeo Fletcher  : 1960  MRN: [id number]  Referring provider: Chet Au MD  Dx:   Encounter Diagnosis     ICD-10-CM    1. Chronic left shoulder pain  M25.512     G89.29           Start Time: 1100  Stop Time: 1215  Total time in clinic (min): 75 minutes    Subjective: My shoulder is feeling better, but, I still get most of my pain reaching over head and behind my back.       Objective: See treatment diary below      Assessment: Tolerated treatment well. Patient would benefit from continued PT  we increased UBE to 3/3 today with only some mild fatigue and soreness. He is showing some improvement with his motion , but, still has tightness in all planes.  He has most tightness with flex, ER and IR. His strength is improving over all. He reports that he still has most pain with passive ER today.  We spent some extra time today working on his motion.       Plan: Continue per plan of care.      Precautions: None      Date 5/15 5/20 5/24 6/3 6/5   FOTO   Jf  63  JF 65   Manuals        L shoulder PROM 10 min 10 minJF 10 min jf 10 min 10 min  8   Inferior and posterior Grade 4 joint mobilizations to L GH joint    5 min                    Neuro Re-Ed        Standing flexion and abduction 15x / 15  x 20 x/ 20 x 20 x 20 x   3 \" 20x abd 30 x 3# flex  3# abduction 30 x                                                   Ther Ex        Supine wand flexion 15x 5\" 5\" 15x 5\" 15x  5\" 15x      Supine wand ER 5\" 15x 5\" 15x 5\" 15x 5\" 15x 5\" 15x   Table slide flex, ER 5\" 15x  wall  5\" 15x 5\" 15x  5\" 15x wall slide   Pulleys 5\" 15 x  5\" 10x 5\" 10x 5\" 15x 5\" 20x   No monies 15 x   3 \"  15x   15 x OTB   Shoulder shrugs 20 x 2\" 15x 3\" 20 x  20 x   3\"   5#    CC row  5 \" 15 x  2\" x15 P 2   20 x P2 20x P 2 20 x   UBE for mobility and strengthening L 2  2/2  L 2  2/2 L 2 2/2 L2 2'/2' L2 3/3   Table traction stretch 10 \" 5 x 10\" 5 x 10 \" 5  x 10\" 10x 10\"  5 x    Ther Activity      "                   Gait Training                        Modalities        ANDERSONP to L shoulder

## 2024-06-11 ENCOUNTER — OFFICE VISIT (OUTPATIENT)
Dept: PHYSICAL THERAPY | Facility: CLINIC | Age: 64
End: 2024-06-11
Payer: MEDICARE

## 2024-06-11 DIAGNOSIS — G89.29 CHRONIC LEFT SHOULDER PAIN: Primary | ICD-10-CM

## 2024-06-11 DIAGNOSIS — M25.512 CHRONIC LEFT SHOULDER PAIN: Primary | ICD-10-CM

## 2024-06-11 PROCEDURE — 97112 NEUROMUSCULAR REEDUCATION: CPT

## 2024-06-11 PROCEDURE — 97140 MANUAL THERAPY 1/> REGIONS: CPT

## 2024-06-11 PROCEDURE — 97110 THERAPEUTIC EXERCISES: CPT

## 2024-06-11 NOTE — PROGRESS NOTES
"Daily Note     Today's date: 2024  Patient name: Amadeo Fletcher  : 1960  MRN: [id number]  Referring provider: Chet Au MD  Dx:   Encounter Diagnosis     ICD-10-CM    1. Chronic left shoulder pain  M25.512     G89.29                      Subjective: Pt reports mild posterior/lateral  L shoulder soreness.       Objective: See treatment diary below      Assessment: Tolerated treatment well. Patient exhibited good technique with therapeutic exercises. Min increased L shoulder soreness with TE.      Plan: Continue per plan of care.      Precautions: None      Date 6/11 5/20 5/24 6/3 6/5   FOTO   Jf  63  JF 65   Manuals        L shoulder PROM ds 10 minJF 10 min jf 10 min 10 min  8   Inferior and posterior Grade 4 joint mobilizations to L GH joint    5 min            Neuro Re-Ed        Standing flexion and abduction 3# 30x ea, scapt 20 x/ 20 x 20 x 20 x   3 \" 20x abd 30 x 3# flex  3# abduction 30 x                                   Ther Ex        Supine wand flexion NP 5\" 15x 5\" 15x  5\" 15x      Supine wand ER 5\" 15x 5\" 15x 5\" 15x 5\" 15x 5\" 15x   Wall slide 15x 5\" 5\" 15x 5\" 15x  5\" 15x wall slide   Pulleys 20x 5\" 5\" 10x 5\" 10x 5\" 15x 5\" 20x   No monies L2 tb 15x  15x   15 x OTB   Shoulder shrugs 5# 20x 3\" 2\" 15x 3\" 20 x  20 x   3\"   5#    CC row  P2 2/15 2\" x15 P 2   20 x P2 20x P 2 20 x   UBE for mobility and strengthening 6m L2 L 2  2/2 L 2 2/2 L2 2'/2' L2 3/3   Table traction stretch 5x 10\" 10\" 5 x 10 \" 5  x 10\" 10x 10\"  5 x    Ther Activity                        Gait Training                Modalities        MHP to L shoulder                                           "

## 2024-06-18 ENCOUNTER — OFFICE VISIT (OUTPATIENT)
Dept: PHYSICAL THERAPY | Facility: CLINIC | Age: 64
End: 2024-06-18
Payer: MEDICARE

## 2024-06-18 DIAGNOSIS — G89.29 CHRONIC LEFT SHOULDER PAIN: Primary | ICD-10-CM

## 2024-06-18 DIAGNOSIS — M25.512 CHRONIC LEFT SHOULDER PAIN: Primary | ICD-10-CM

## 2024-06-18 PROCEDURE — 97110 THERAPEUTIC EXERCISES: CPT

## 2024-06-18 PROCEDURE — 97112 NEUROMUSCULAR REEDUCATION: CPT

## 2024-06-18 NOTE — PROGRESS NOTES
"Daily Note     Today's date: 2024  Patient name: Amadeo Fletcher  : 1960  MRN: [id number]  Referring provider: Chet Au MD  Dx:   Encounter Diagnosis     ICD-10-CM    1. Chronic left shoulder pain  M25.512     G89.29           Start Time: 1145  Stop Time: 1230  Total time in clinic (min): 45 minutes    Subjective: Pt notes some mild soreness; L shld.      Objective: See treatment diary below      Assessment: Tolerated treatment well. Patient exhibited good technique with therapeutic exercises      Plan: Continue per plan of care.      Precautions: None      Date 6/11 6/18 5/24 6/3 6/5   FOTO   Jf  61  JF 63   Manuals        L shoulder PROM ds ds 10 min jf 10 min 10 min  8   Inferior and posterior Grade 4 joint mobilizations to L GH joint    5 min    Neuro Re-Ed        Standing flexion and abduction 3# 30x ea, scapt 3# 30x ea  20 x 20 x   3 \" 20x abd 30 x 3# flex  3# abduction 30 x                           Ther Ex        Supine wand ER 5\" 15x 5\" 15x 5\" 15x 5\" 15x 5\" 15x   Wall slide 15x 5\" 5\" 15x 5\" 15x  5\" 15x wall slide   Pulleys 20x 5\" 20x 5\" 5\" 10x 5\" 15x 5\" 20x   No monies L2 tb 15x  L2 15x    15 x OTB   Shoulder shrugs 5# 20x 3\" 5# 20x 3\" 3\" 20 x  20 x   3\"   5#    CC row  P2 2/15 P2 2/15 + BIN P 2   20 x P2 20x P 2 20 x   UBE for mobility and strengthening 6m L2 L 2  2/2 L 2 2/2 L2 2'/2' L2 3/3   Table traction stretch 5x 10\" 10\" 5 x 10 \" 5  x 10\" 10x 10\"  5 x    Ther Activity                        Gait Training                Modalities        MHP to L shoulder                                             "

## 2024-06-20 ENCOUNTER — OFFICE VISIT (OUTPATIENT)
Dept: PHYSICAL THERAPY | Facility: CLINIC | Age: 64
End: 2024-06-20
Payer: MEDICARE

## 2024-06-20 DIAGNOSIS — M25.512 CHRONIC LEFT SHOULDER PAIN: Primary | ICD-10-CM

## 2024-06-20 DIAGNOSIS — G89.29 CHRONIC LEFT SHOULDER PAIN: Primary | ICD-10-CM

## 2024-06-20 PROCEDURE — 97530 THERAPEUTIC ACTIVITIES: CPT

## 2024-06-20 PROCEDURE — 97112 NEUROMUSCULAR REEDUCATION: CPT

## 2024-06-20 PROCEDURE — 97110 THERAPEUTIC EXERCISES: CPT

## 2024-06-20 NOTE — PROGRESS NOTES
"Daily Note     Today's date: 2024  Patient name: Amadeo Fletcher  : 1960  MRN: [id number]  Referring provider: Chet Au MD  Dx:   Encounter Diagnosis     ICD-10-CM    1. Chronic left shoulder pain  M25.512     G89.29           Start Time: 1145  Stop Time: 1230  Total time in clinic (min): 45 minutes    Subjective: Pt notes some mild posterior/lateral shoulder sorenss.      Objective: See treatment diary below      Assessment: Tolerated treatment well. Patient exhibited good technique with therapeutic exercises      Plan: Continue per plan of care.      Precautions: None      Date 6/11 6/18 6/20 6/3 6/5   FOTO     JF 63   Manuals        L shoulder PROM ds ds ds 10 min 10 min  8   Inferior and posterior Grade 4 joint mobilizations to L GH joint    5 min    Neuro Re-Ed        Standing flexion and abduction 3# 30x ea, scapt 3# 30x ea  3# 30x ea 20x abd 30 x 3# flex  3# abduction 30 x                           Ther Ex        Supine wand ER 5\" 15x 5\" 15x 5\" 15x 5\" 15x 5\" 15x   Wall slide 15x 5\" 5\" 15x 5\" 15x  5\" 15x wall slide   Pulleys 20x 5\" 20x 5\" 20x 5\" 5\" 15x 5\" 20x   No monies L2 tb 15x  L2 15x  L2 15x   15 x OTB   Shoulder shrugs 5# 20x 3\" 5# 20x 3\" DC  20 x   3\"   5#    CC ROW /BIN P2 2/15 P2 2/15 + BIN P 2   20 x P2 20x P 2 20 x   UBE for mobility and strengthening 6m L2 L 2  2/2 6m L2 F/R L2 2'/2' L2 3/3   Table traction stretch 5x 10\" 10\" 5 x 10 \" 5  x 10\" 10x 10\"  5 x    Ther Activity        Bryant Carry   5# 3 laps             Gait Training                Modalities        MHP to L shoulder                                               "